# Patient Record
Sex: MALE | Race: OTHER | URBAN - METROPOLITAN AREA
[De-identification: names, ages, dates, MRNs, and addresses within clinical notes are randomized per-mention and may not be internally consistent; named-entity substitution may affect disease eponyms.]

---

## 2018-04-05 ENCOUNTER — HOSPITAL ENCOUNTER (OUTPATIENT)
Dept: LAB | Age: 38
Discharge: HOME | End: 2018-04-05

## 2018-04-05 ENCOUNTER — OFFICE VISIT (OUTPATIENT)
Dept: FAMILY MEDICINE CLINIC | Age: 38
End: 2018-04-05

## 2018-04-05 VITALS
BODY MASS INDEX: 30.01 KG/M2 | HEART RATE: 62 BPM | DIASTOLIC BLOOD PRESSURE: 85 MMHG | WEIGHT: 191.2 LBS | TEMPERATURE: 97.9 F | SYSTOLIC BLOOD PRESSURE: 124 MMHG | HEIGHT: 67 IN

## 2018-04-05 DIAGNOSIS — Z13.9 ENCOUNTER FOR SCREENING: Primary | ICD-10-CM

## 2018-04-05 DIAGNOSIS — H10.021 OTHER MUCOPURULENT CONJUNCTIVITIS OF RIGHT EYE: ICD-10-CM

## 2018-04-05 DIAGNOSIS — N48.89 NODULE OF SHAFT OF PENIS: ICD-10-CM

## 2018-04-05 DIAGNOSIS — L73.9 FOLLICULITIS: ICD-10-CM

## 2018-04-05 LAB
BILIRUB UR QL STRIP: NEGATIVE
GLUCOSE POC: NORMAL MG/DL
GLUCOSE UR-MCNC: NEGATIVE MG/DL
KETONES P FAST UR STRIP-MCNC: NEGATIVE MG/DL
PH UR STRIP: 7 [PH] (ref 4.6–8)
PROT UR QL STRIP: NEGATIVE
SP GR UR STRIP: 1.01 (ref 1–1.03)
UA UROBILINOGEN AMB POC: NORMAL (ref 0.2–1)
URINALYSIS CLARITY POC: CLEAR
URINALYSIS COLOR POC: YELLOW
URINE BLOOD POC: NEGATIVE
URINE LEUKOCYTES POC: NEGATIVE
URINE NITRITES POC: NEGATIVE

## 2018-04-05 PROCEDURE — 86592 SYPHILIS TEST NON-TREP QUAL: CPT | Performed by: PHYSICIAN ASSISTANT

## 2018-04-05 PROCEDURE — 87491 CHLMYD TRACH DNA AMP PROBE: CPT | Performed by: PHYSICIAN ASSISTANT

## 2018-04-05 PROCEDURE — 87389 HIV-1 AG W/HIV-1&-2 AB AG IA: CPT | Performed by: PHYSICIAN ASSISTANT

## 2018-04-05 RX ORDER — CEPHALEXIN 500 MG/1
500 CAPSULE ORAL 3 TIMES DAILY
Qty: 30 CAP | Refills: 0 | Status: SHIPPED | OUTPATIENT
Start: 2018-04-05

## 2018-04-05 RX ORDER — ERYTHROMYCIN 5 MG/G
OINTMENT OPHTHALMIC
Qty: 1 G | Refills: 0 | Status: SHIPPED | OUTPATIENT
Start: 2018-04-05

## 2018-04-05 NOTE — PROGRESS NOTES
Coordination of Care  1. Have you been to the ER, urgent care clinic since your last visit? Hospitalized since your last visit? No    2. Have you seen or consulted any other health care providers outside of the [facility] since your last visit? Include any pap smears or colon screening. No    Does the patient need refills?  NO    Learning Assessment Complete? yes  Results for orders placed or performed in visit on 04/05/18   AMB POC GLUCOSE BLOOD, BY GLUCOSE MONITORING DEVICE   Result Value Ref Range    Glucose POC 99 nf mg/dL   AMB POC URINALYSIS DIP STICK MANUAL W/O MICRO   Result Value Ref Range    Color (UA POC) Yellow     Clarity (UA POC) Clear     Glucose (UA POC) Negative Negative    Bilirubin (UA POC) Negative Negative    Ketones (UA POC) Negative Negative    Specific gravity (UA POC) 1.015 1.001 - 1.035    Blood (UA POC) Negative Negative    pH (UA POC) 7.0 4.6 - 8.0    Protein (UA POC) Negative Negative    Urobilinogen (UA POC) normal 0.2 - 1    Nitrites (UA POC) Negative Negative    Leukocyte esterase (UA POC) Negative Negative

## 2018-04-05 NOTE — PROGRESS NOTES
Assessment/Plan:    Diagnoses and all orders for this visit:    1. Encounter for screening  -     AMB POC GLUCOSE BLOOD, BY GLUCOSE MONITORING DEVICE  -     AMB POC URINALYSIS DIP STICK MANUAL W/O MICRO    2. Folliculitis  -     cephALEXin (KEFLEX) 500 mg capsule; Take 1 Cap by mouth three (3) times daily. - this is within his pubic hair region, it is a rather sizable pustule today with surrounding erythema and tenderness      3. Nodule of shaft of penis  -     RPR; Future  -     HIV 1/2 AG/AB, 4TH GENERATION,W RFLX CONFIRM; Future  -     CHLAMYDIA/GC PCR; Future  Pt states this is new, it has an unusual appearance and I want to re-examine it after completion of his medication, it does not appear to be inflammatory but he states that it started at the same time as the more typical appearing lesion in his pubic hair region (mons pubis)    4. Other mucopurulent conjunctivitis of right eye  -     erythromycin (ILOTYCIN) ophthalmic ointment; Sig; use 0.5 cm ribbon in OD qid for 5 days    I called the pharmacy to ensure that both of his medications are on their $4 list and they both are - I left him a message with this information    Follow-up Disposition:  Return in about 3 weeks (around 4/26/2018). JESUS Blount expressed understanding of this plan. An AVS was printed and given to the patient.      ----------------------------------------------------------------------    Chief Complaint   Patient presents with    Urinary Burning     pt c/o pain and burning during urination and intercourse, also itchy bumps on penis.  Other     pt c/o frequent urination at night, blurry vision, feels tired. History of Present Illness:  Pt here for lesions on his penis. He tells me that he DOES not have dysuria but that he has spots that are concerning to him on his penis that are new.  He states that his wife, with whom he has been monogamous for 3 years, has recently been treated for a yeast infection and this is when he noticed his lesions. He states that his wife does not have any lesions or discharge now    He also has a new problem with redness and discharge from his right eye only that has been present for more then one week. This eye is now sensitive to light   He does not have any joint pain/knee pain, etc.         No past medical history on file. Current Outpatient Prescriptions   Medication Sig Dispense Refill    cephALEXin (KEFLEX) 500 mg capsule Take 1 Cap by mouth three (3) times daily. 30 Cap 0    erythromycin (ILOTYCIN) ophthalmic ointment Sig; use 0.5 cm ribbon in OD qid for 5 days 1 g 0    loratadine (CLARITIN) 10 mg tablet Take 1 Tab by mouth daily. 30 Tab 6    triamcinolone acetonide (KENALOG) 0.1 % topical cream Apply  to affected area two (2) times a day. use thin layer 80 g 0       No Known Allergies    Social History   Substance Use Topics    Smoking status: Former Smoker     Types: Cigarettes     Quit date: 4/5/2016    Smokeless tobacco: Former User     Quit date: 3/30/2013      Comment: 1 a day    Alcohol use Yes      Comment: occ       No family history on file. Physical Exam:     Visit Vitals    /85 (BP 1 Location: Right arm)    Pulse 62    Temp 97.9 °F (36.6 °C) (Oral)    Ht 5' 6.61\" (1.692 m)    Wt 191 lb 3.2 oz (86.7 kg)    BMI 30.29 kg/m2       A&Ox3  WDWN NAD  Respirations normal and non labored  Right eye: red conjunctiva with swelling of the eyelids, no acute discharge at this time  Left eye is clear  : uncircumcised penis. On shaft of foreskin there is a 3-4 mm nodular mass with central indentation.  Within his pubic hair mons pubis region he has a large red tender pustular lesion with surrounding induration  Results for orders placed or performed in visit on 04/05/18   AMB POC URINALYSIS DIP STICK MANUAL W/O MICRO     Status: None   Result Value Ref Range Status    Color (UA POC) Yellow  Final    Clarity (UA POC) Clear  Final Glucose (UA POC) Negative Negative Final    Bilirubin (UA POC) Negative Negative Final    Ketones (UA POC) Negative Negative Final    Specific gravity (UA POC) 1.015 1.001 - 1.035 Final    Blood (UA POC) Negative Negative Final    pH (UA POC) 7.0 4.6 - 8.0 Final    Protein (UA POC) Negative Negative Final    Urobilinogen (UA POC) normal 0.2 - 1 Final    Nitrites (UA POC) Negative Negative Final    Leukocyte esterase (UA POC) Negative Negative Final

## 2018-04-05 NOTE — MR AVS SNAPSHOT
[address] [place] [address] [address] [facility] 57 
[phone] Patient: Saravanan Ibanez 
MRN: [id number] :1980 Visit Information Elizabeth Simms y Nano Personal Médico Departamento Teléfono del Dep. Número de visita 2018 10:00 AM Cortez Hugo Leticialeumel Gonsalo, REJI Jackson [phone] Follow-up Instructions Return in about 3 weeks (around 2018). Upcoming Health Maintenance Date Due Pneumococcal 19-64 Medium Risk (1 of 1 - PPSV23) 10/25/1999 DTaP/Tdap/Td series (1 - Tdap) 10/25/2001 Influenza Age 5 to Adult 2017 Alergias  Review Complete El: 2018 Por: Gabrielle Osorio A partir del:  2018 No Known Allergies Vacunas actuales Revisadas el:  2016 Tre Gannon Influenza Vaccine (Quad) PF 2016 No revisadas esta visita You Were Diagnosed With   
  
 Xavier Funes Encounter for screening    -  Primary ICD-10-CM: Z13.9 ICD-9-CM: V82.9 Folliculitis     ESO-25-RU: L73.9 ICD-9-CM: 704.8 Nodule of shaft of penis     ICD-10-CM: N48.89 ICD-9-CM: 607.89 Other mucopurulent conjunctivitis of right eye     ICD-10-CM: H10.021 ICD-9-CM: 372.03 Partes vitales PS Pulso Temperatura [place] ( percentil de crecimiento) Peso (percentil de crecimiento) BMI (IMC)  
 124/85 (BP 1 Location: Right arm) 62 97.9 °F (36.6 °C) (Oral) 5' 6.61\" (1.692 m) 191 lb 3.2 oz (86.7 kg) 30.29 kg/m2 Estatus de tabaquísmo Former Smoker Historial de signos vitales BMI and BSA Data Body Mass Index Body Surface Area  
 30.29 kg/m 2 2.02 m 2 [facility] Name Phone [address], [address] [place], [place] [phone] Bello lista de medicamentos actualizada Idania Ramires actualizada 18 12:28 PM.  Paco Cloud use bello lista de medicamentos más reciente. cephALEXin 500 mg capsule También conocido ranjan:  Candy Gabriel Take 1 Cap by mouth three (3) times daily. erythromycin ophthalmic ointment También conocido ranjan:  ILOTYCIN Sig; use 0.5 cm ribbon in OD qid for 5 days  
  
 loratadine 10 mg tablet También conocido ranjan:  Vivian Nett Take 1 Tab by mouth daily. triamcinolone acetonide 0.1 % topical cream  
También conocido ranjan:  KENALOG Apply  to affected area two (2) times a day. use thin layer Recetas Enviado a la Tyrell Refills  
 cephALEXin (KEFLEX) 500 mg capsule 0 Sig: Take 1 Cap by mouth three (3) times daily. Class: Normal  
 Pharmacy: [address] [place] [address]  Ph #: [phone] Route: Oral  
 erythromycin (ILOTYCIN) ophthalmic ointment 0 Sig: Sig; use 0.5 cm ribbon in OD qid for 5 days Class: Normal  
 Pharmacy: [address] [place] [address]  Ph #: [phone] Hicimos lo siguiente AMB POC GLUCOSE BLOOD, BY GLUCOSE MONITORING DEVICE [00157 CPT(R)] AMB POC URINALYSIS DIP STICK MANUAL W/O MICRO [78551 CPT(R)] Instrucciones de seguimiento Return in about 3 weeks (around 4/26/2018). Instrucciones para el Paciente Conjuntivitis: Instrucciones de cuidado - [ Pinkeye: Care Instructions ] Instrucciones de cuidado La conjuntivitis es el enrojecimiento y la hinchazón de la superficie del liv y de la conjuntiva (el recubrimiento del párpado y de la parte mickie del liv). La conjuntivitis suele ser causada por mandeep infección bacteriana o viral. El aire seco, las [place], [place] humo y las sustancias químicas son otras causas comunes. La conjuntivitis suele sanar por sí jorge al cabo de 7 a 10 días. Los antibióticos solo ayudan si la conjuntivitis está causada por bacterias. La conjuntivitis causada por mandeep infección se propaga fácilmente.  Si mandeep alergia o sustancia química es la causa de la conjuntivitis, esta no desaparecerá a menos que usted evite lo que la esté causando. La atención de seguimiento es mandeep parte clave de lozano tratamiento y seguridad. Asegúrese de hacer y acudir a todas las citas, y llame a lozano médico si está teniendo problemas. También es mandeep buena idea saber los resultados de los exámenes y mantener mandeep lista de los medicamentos que gonzález. Cómo puede cuidarse en el hogar? · Lávese las huong con frecuencia. Siempre láveselas antes y después de tratarse la conjuntivitis o de tocarse los ojos o la nigel. · Utilice un algodón húmedo o un paño limpio y húmedo para retirar las costras. Limpie desde la esquina interior del liv hacia afuera. Use mandeep parte limpia del paño para cada pasada. · Colóquese paños húmedos, fríos o tibios, sobre el liv unas cuantas veces al día si el liv le duele. · No use lentes de contacto ni maquillaje para los ojos hasta que la conjuntivitis haya desaparecido. Deseche todo el maquillaje para ojos que usaba cuando comenzó la conjuntivitis. Limpie fartun lentes de contacto y lozano estuche. Si [place] lentes de contacto desechables, use un par nuevo cuando el liv haya sanado y sea seguro volver a usar lentes de contacto. · Si el médico le recetó mandeep pomada o gotas antibióticas para los ojos, úselas según las indicaciones. Use el medicamento todo el tiempo indicado, aunque el liv comience a verse mejor en poco tiempo. Mantenga limpia la punta del frasco y no permita que la misma toque la olayinka del liv. · Para ponerse gotas para los ojos o pomada: 
¨ Incline la [place] atrás y lleve el párpado inferior hacia abajo con un dedo. ¨ Deje caer unas gotas o un chorrito del medicamento dentro del párpado inferior. ¨ Cierre el liv por entre 30 y 61 segundos para permitir que las gotas o la pomada se esparzan. ¨ No permita que la punta del gotero o del tubo de pomada toque las pestañas ni ninguna otra superficie. · No comparta toallas de baño, almohadas ni toallitas para la nigel mientras tenga conjuntivitis. Cuándo debe pedir ayuda? Llame a lozano médico ahora mismo o busque atención médica inmediata si: 
? · Tiene dolor en el liv, no solo irritación en la superficie. ? · Tiene algún cambio en la vista o pérdida de la visión. ? · Tiene mayor secreción del liv. ? · El liv no ha comenzado a mejorar o empeora dentro de las 50 horas después de empezar a usar antibióticos. ? · La conjuntivitis dura más de 7 días. ?Preste especial atención a los cambios en lozano alycia y asegúrese de comunicarse con lozano médico si tiene algún problema. Dónde puede encontrar más información en inglés? Radha Cortez a [email or web address] Sree Her [id number] en la búsqueda para aprender más acerca de \"Conjuntivitis: Instrucciones de cuidado - [ Pinkeye: Care Instructions ]. \" 
Revisado: 20 marzo, 2017 Versión del contenido: 11.4 © 7075-9406 Healthwise, Incorporated. Las instrucciones de cuidado fueron adaptadas bajo licencia por [employer] (which disclaims liability or warranty for this information). Si usted tiene [place] afección médica o sobre estas instrucciones, siempre pregunte a lozano profesional de alycia. Healthwise, Incorporated niega toda garantía o responsabilidad por lozano uso de esta información. Foliculitis: Instrucciones de cuidado - [ Folliculitis: Care Instructions ] Instrucciones de cuidado La foliculitis es Jose-Belle sacos (folículos) de la piel de donde crece el pelo. Puede aparecer en cualquier parte del cuerpo, maria ines es más común en el cuero cabelludo, la nigel, las axilas y la margie. Las bacterias, ranjan las que hay en mandeep bañera de hidromasaje, pueden causar foliculitis. La foliculitis comienza ranjan mandeep olayinka enrojecida y sensible cerca de un pelo. La piel puede picar o arder y [place].  A veces, la foliculitis puede conducir a infecciones cutáneas (de la piel) más graves. Por lo general, lozano médico puede tratar la foliculitis leve con mandeep crema o mandeep pomada antibióticas. Si tiene foliculitis en el cuero cabelludo, puede usar un champú que elimina las bacterias. Los antibióticos que se dutch en pastillas pueden tratar infecciones más profundas en la piel. Para los casos rebeldes de foliculitis, el tratamiento con láser podría ser mandeep opción. El tratamiento con láser utiliza claire intensos de clemencia para destruir el folículo del pelo. Pinky el pelo no volverá a crecer en la olayinka tratada. La atención de seguimiento es mandeep parte clave de lozano tratamiento y seguridad. Asegúrese de hacer y acudir a todas las citas, y llame a lozano médico si está teniendo problemas. También es mandeep buena idea saber los resultados de los exámenes y mantener mandeep lista de los medicamentos que gonzález. Cómo puede cuidarse en el hogar? · [employer] medicamentos exactamente ranjan le fueron recetados. Si lozano médico le recetó antibióticos, tómelos según las indicaciones. No deje de tomarlos por el hecho de sentirse mejor. Debe moy todos los antibióticos hasta terminarlos. · Utilice jabón que elimine las bacterias para lavarse la olayinka infectada. Si tiene el cuero cabelludo o la smith infectados, utilice champú con selenio o propilenglicol. Phylliss Lovell. No frote gordo mucho tiempo ni con demasiada fuerza. · Mezcle 1 y 1/3 tazas de agua tibia y 1 cucharada de vinagre. Humedezca un paño en la mezcla y colóquelo sobre la piel infectada hasta que se enfríe (por lo general de 5 a 10 minutos). Podría hacer esto entre 3 y 6 veces al día. · No comparta lozano afeitadora, toallas ni paños para lavarse. [place] puede hacer propagar la foliculitis. · Use mandeep hoja de afeitar nueva cada vez que se afeite para evitar volver a infectar lozano piel. · Si tiende a tener foliculitis, evite las bañeras de hidromasaje. Pueden tener bacterias que causan foliculitis. Cuándo debe pedir ayuda? Llame a lozano médico ahora mismo o busque atención médica inmediata si: 
? · Tiene síntomas de infección, tales ranjan: ¨ Aumento del dolor, la hinchazón, la temperatura o el enrojecimiento. ¨ Vetas rojizas que salen de la olayinka. ¨ Pus que sale de la olayinka. Kacie Rojas. ?Preste especial atención a los cambios en lozano alycia y asegúrese de comunicarse con lozano médico si: 
? · No mejora ranjan se esperaba. Dónde puede encontrar más información en inglés? Vic Car a [email or web address] Guanaco Nielsen [id number] en la búsqueda para aprender más acerca de \"Foliculitis: Instrucciones de cuidado - [ Folliculitis: Care Instructions ]. \" 
Revisado: 13 octubre, 2016 Versión del contenido: 11.4 © 4687-1309 Healthwise, Incorporated. Las instrucciones de cuidado fueron adaptadas bajo licencia por [employer] (which disclaims liability or warranty for this information). Si usted tiene [place] afección médica o sobre estas instrucciones, siempre pregunte a lozano profesional de alycia. Healthwise, Incorporated niega toda garantía o responsabilidad por lozano uso de esta información. Introducing [facility]! Cal Finney introduce portal paciente MyChart . Ahora se puede acceder a partes de lozano expediente médico, enviar por correo electrónico la oficina de lozano médico y solicitar renovaciones de medicamentos en línea. En lozano navegador de Internet , [place] a [email or web address] Meir enrico en el usuario por Reynold Cotton? Chidi Cordone enrico aquí en la sesión [place]. Verá la página de registro [place]. Ingrese lozano código de [employer] austin y ranjan aparece a continuación. Usted no tendrá que [employer] código después de aamir completado el proceso de registro . Si ted no se inscribe antes de la fecha de caducidad , debe solicitar un nuevo código. · MyChart Código de acceso : [id number] Expires: 7/4/2018 12:28 PM 
 
 Ingresa los últimos cuatro dígitos de lozano Número de Seguro Social ( xxxx ) y fecha de nacimiento ( dd / mm / aaaa ) ranjan se indica y meir clic en Enviar. Usted será llevado a la siguiente página de registro . Crear un ID MyChart . Esta será lozano ID de inicio de sesión de MyChart y no puede ser [place] , por lo que pensar en mandeep que es [place] y fácil de recordar . Crear mandeep contraseña MyChart . Usted puede cambiar lozano contraseña en cualquier momento . Ingrese lozano Password Reset de preguntas y [place] . [place] se puede utilizar en un momento posterior si usted olvida lozano contraseña. Introduzca lozano dirección de correo electrónico . [place] recibirá mandeep notificación por correo electrónico cuando la nueva información está disponible en MyChart . Dayanara Knock clic en Registrarse. Odette Roman [employer] yolanda y descargar porciones de lozano expediente médico. 
Meir clic en el enlace de descarga del menú Resumen para descargar mandeep copia portátil de lozano información médica . Si tiene [employer] , por favor visite la sección de preguntas frecuentes del sitio web MyChart . Recuerde, MyChart NO es que se utilizará para las necesidades urgentes. Para emergencias médicas , llame al 911 . Ahora disponible en lozano iPhone y Android ! Por favor proporcione mago resumen de la documentación de cuidado a lozano próximo proveedor. If you have any questions after today's visit, please call [phone].

## 2018-04-05 NOTE — PROGRESS NOTES
AVS printed and reviewed. Keflex is $4 at [facility] and Eye ointment  w/ Good Rx coupon is $55.   Eye ointment should be $4 at [facility]. Pt called by provider. Message left re cost of eye ointment. Pt called back and $4 eye ointment discussed. F/u appt scheduled.

## 2018-04-05 NOTE — PATIENT INSTRUCTIONS
Conjuntivitis: Instrucciones de cuidado - [ Pinkeye: Care Instructions ]  Instrucciones de cuidado    La conjuntivitis es el enrojecimiento y la hinchazón de la superficie del liv y de la conjuntiva (el recubrimiento del párpado y de la parte mickie del liv). La conjuntivitis suele ser causada por mandeep infección bacteriana o viral. El aire seco, las [place] humo y las sustancias químicas son otras causas comunes. La conjuntivitis suele sanar por sí jorge al cabo de 7 a 10 días. Los antibióticos solo ayudan si la conjuntivitis está causada por bacterias. La conjuntivitis causada por mandeep infección se propaga fácilmente. Si mandeep alergia o sustancia química es la causa de la conjuntivitis, esta no desaparecerá a menos que usted evite lo que la esté causando. La atención de seguimiento es mandeep parte clave de lozano tratamiento y seguridad. Asegúrese de hacer y acudir a todas las citas, y llame a lozano médico si está teniendo problemas. También es mandeep buena idea saber los resultados de los exámenes y mantener mandeep lista de los medicamentos que gonzález. ¿Cómo puede cuidarse en el hogar? · Lávese las huong con frecuencia. Siempre láveselas antes y después de tratarse la conjuntivitis o de tocarse los ojos o la nigel. · Utilice un algodón húmedo o un paño limpio y húmedo para retirar las costras. Limpie desde la esquina interior del liv hacia afuera. Use mandeep parte limpia del paño para cada pasada. · Colóquese paños húmedos, fríos o tibios, sobre el liv unas cuantas veces al día si el liv le duele. · No use lentes de contacto ni maquillaje para los ojos hasta que la conjuntivitis haya desaparecido. Deseche todo el maquillaje para ojos que usaba cuando comenzó la conjuntivitis. Limpie fartun lentes de contacto y lozano estuche. Si [place] lentes de contacto desechables, use un par nuevo cuando el liv haya sanado y sea seguro volver a usar lentes de contacto.   · Si el médico le recetó [employer] o gotas antibióticas para los ojos, [facility] según las indicaciones. Use el medicamento todo el tiempo indicado, aunque el liv comience a verse mejor en poco tiempo. Mantenga limpia la punta del frasco y no permita que la misma toque la olayinka del lvi. · Para ponerse gotas para los ojos o pomada:  ¨ Incline la [place] atrás y lleve el párpado inferior hacia abajo con un dedo. ¨ Deje caer unas gotas o un chorrito del medicamento dentro del párpado inferior. ¨ Cierre el liv por entre 30 y 61 segundos para permitir que las gotas o la pomada se esparzan. ¨ No permita que la punta del gotero o del tubo de pomada toque las pestañas ni ninguna otra superficie. · No comparta toallas de baño, almohadas ni toallitas para la nigel mientras tenga conjuntivitis. ¿Cuándo debe pedir ayuda? Llame a lozano médico ahora mismo o busque atención médica inmediata si:  ? · Tiene dolor en el liv, no solo irritación en la superficie. ? · Tiene algún cambio en la vista o pérdida de la visión. ? · Tiene mayor secreción del liv. ? · El liv no ha comenzado a mejorar o empeora dentro de las 50 horas después de empezar a usar antibióticos. ? · La conjuntivitis dura más de 7 días. ?Preste especial atención a los cambios en lozano alycia y asegúrese de comunicarse con lozano médico si tiene algún problema. ¿Dónde puede encontrar más información en inglés? Jacob Brody a http://edd-renzo.info/. Jonny Espianl [id number] en la búsqueda para aprender más acerca de \"Conjuntivitis: Instrucciones de cuidado - [ Luis Miguel: Care Instructions ]. \"  Revisado: 20 Minoo Poisson 2017  Versión del contenido: 11.4  © 7934-2730 Healthwise, Incorporated. Las instrucciones de cuidado fueron adaptadas bajo licencia por [employer] (which disclaims liability or warranty for this information). Si usted tiene [place] afección médica o sobre estas instrucciones, siempre pregunte a lozano profesional de alycia.  [employer] niega toda garantía o responsabilidad por lozano uso de esta información. Foliculitis: Instrucciones de cuidado - [ Folliculitis: Care Instructions ]  Instrucciones de cuidado    La foliculitis es mandeep infección en los sacos (folículos) de la piel de donde crece el pelo. Puede aparecer en cualquier parte del cuerpo, pinky es más común en el cuero cabelludo, la nigel, las axilas y la margie. Las bacterias, ranjan las que hay en mandeep bañera de hidromasaje, pueden causar foliculitis. La foliculitis comienza ranjan mandeep olayinka enrojecida y sensible cerca de un pelo. La piel puede picar o arder y [place]. A veces, la foliculitis puede conducir a infecciones cutáneas (de la piel) más graves. Por lo general, lozano médico puede tratar la foliculitis leve con mandeep crema o mandeep pomada antibióticas. Si tiene foliculitis en el cuero cabelludo, puede usar un champú que elimina las bacterias. Los antibióticos que se dutch en pastillas pueden tratar infecciones más profundas en la piel. Para los casos rebeldes de foliculitis, el tratamiento con láser podría ser mandeep opción. El tratamiento con láser utiliza claire intensos de clemencia para destruir el folículo del pelo. Pinky el pelo no volverá a crecer en la olayinka tratada. La atención de seguimiento es mandeep parte clave de lozano tratamiento y seguridad. Asegúrese de hacer y acudir a todas las citas, y llame a lozano médico si está teniendo problemas. También es mandeep buena idea saber los resultados de los exámenes y mantener mandeep lista de los medicamentos que gonzález. ¿Cómo puede cuidarse en el hogar? · [employer] medicamentos exactamente ranjan le fueron recetados. Si lozano médico le recetó antibióticos, tómelos según las indicaciones. No deje de tomarlos por el hecho de sentirse mejor. Debe moy todos los antibióticos hasta terminarlos. · Utilice jabón que elimine las bacterias para lavarse la olayinka infectada. Si tiene el cuero cabelludo o la smith infectados, utilice champú con selenio o propilenglicol. Josh Whitehead.  No frote gordo mucho tiempo ni con demasiada fuerza. · Mezcle 1 y 1/3 tazas de agua tibia y 1 cucharada de vinagre. Humedezca un paño en la mezcla y colóquelo sobre la piel infectada hasta que se enfríe (por lo general de 5 a 10 minutos). Podría hacer esto entre 3 y 6 veces al día. · No comparta lozano afeitadora, toallas ni paños para lavarse. [place] puede hacer propagar la foliculitis. · Use mandeep hoja de afeitar nueva cada vez que se afeite para evitar volver a infectar lozano piel. · Si tiende a tener foliculitis, evite las bañeras de hidromasaje. Pueden tener bacterias que causan foliculitis. ¿Cuándo debe pedir ayuda? Llame a lozano médico ahora mismo o busque atención médica inmediata si:  ? · Tiene síntomas de infección, tales ranjan:  ¨ Aumento del dolor, la hinchazón, la temperatura o el enrojecimiento. ¨ Vetas rojizas que salen de la olayinka. ¨ Pus que sale de la olayinka. [place]. ?Preste especial atención a los cambios en lozano alycia y asegúrese de comunicarse con lozano médico si:  ? · No mejora ranjan se esperaba. ¿Dónde puede encontrar más información en inglés? Razia Michele a http://edd-renzo.info/. Adilia Frank [id number] en la búsqueda para aprender más acerca de \"Foliculitis: Instrucciones de cuidado - [ Folliculitis: Care Instructions ]. \"  Revisado: 13 octubre, 2016  Versión del contenido: 11.4  © 3482-1432 Healthwise, Incorporated. Las instrucciones de cuidado fueron adaptadas bajo licencia por [employer] (which disclaims liability or warranty for this information). Si usted tiene [place] afección médica o sobre estas instrucciones, siempre pregunte a lozano profesional de alycia. [facility] niega toda garantía o responsabilidad por lozano uso de esta información.

## 2018-04-06 LAB
C TRACH DNA SPEC QL NAA+PROBE: NEGATIVE
HIV 1+2 AB+HIV1 P24 AG SERPL QL IA: NONREACTIVE
HIV12 RESULT COMMENT, HHIVC: NORMAL
N GONORRHOEA DNA SPEC QL NAA+PROBE: NEGATIVE
RPR SER QL: NONREACTIVE
SAMPLE TYPE: NORMAL
SERVICE CMNT-IMP: NORMAL
SPECIMEN SOURCE: NORMAL

## 2018-04-27 ENCOUNTER — OFFICE VISIT (OUTPATIENT)
Dept: FAMILY MEDICINE CLINIC | Age: 38
End: 2018-04-27

## 2018-04-27 VITALS
SYSTOLIC BLOOD PRESSURE: 132 MMHG | DIASTOLIC BLOOD PRESSURE: 73 MMHG | HEART RATE: 87 BPM | BODY MASS INDEX: 30.74 KG/M2 | WEIGHT: 194 LBS | TEMPERATURE: 98.1 F

## 2018-04-27 DIAGNOSIS — Z11.4 ENCOUNTER FOR SCREENING FOR HIV: Primary | ICD-10-CM

## 2018-04-27 NOTE — PROGRESS NOTES
Coordination of Care  1. Have you been to the ER, urgent care clinic since your last visit? Hospitalized since your last visit? No    2. Have you seen or consulted any other health care providers outside of the [address] [place] [address] since your last visit? Include any pap smears or colon screening. No    Does the patient need refills? N/A    Learning Assessment Complete?  yes

## 2019-10-31 ENCOUNTER — OFFICE VISIT (OUTPATIENT)
Dept: FAMILY MEDICINE CLINIC | Age: 39
End: 2019-10-31

## 2019-10-31 VITALS
BODY MASS INDEX: 30.13 KG/M2 | HEIGHT: 67 IN | TEMPERATURE: 97.7 F | SYSTOLIC BLOOD PRESSURE: 135 MMHG | HEART RATE: 69 BPM | WEIGHT: 192 LBS | DIASTOLIC BLOOD PRESSURE: 88 MMHG | OXYGEN SATURATION: 98 %

## 2019-10-31 DIAGNOSIS — Z23 ENCOUNTER FOR IMMUNIZATION: ICD-10-CM

## 2019-10-31 DIAGNOSIS — R10.31 GROIN PAIN, RIGHT: Primary | ICD-10-CM

## 2019-10-31 RX ORDER — NAPROXEN 500 MG/1
500 TABLET ORAL 2 TIMES DAILY WITH MEALS
Qty: 60 TAB | Refills: 0 | Status: SHIPPED | OUTPATIENT
Start: 2019-10-31

## 2019-10-31 NOTE — PROGRESS NOTES
Assessment/Plan:    Diagnoses and all orders for this visit:    1. Groin pain, right  -     REFERRAL TO GENERAL SURGERY  -     naproxen (NAPROSYN) 500 mg tablet; Take 1 Tab by mouth two (2) times daily (with meals). Diff dx includes both inguinal hernia and groin strain. He was instructed to limit lifting, to watch for warning signs for worsening of the sxs, when to return for care   Would appreciate surgical eval and recommendation    Follow-up and Dispositions    · Return if symptoms worsen or fail to improve. JESUS Manjarrez expressed understanding of this plan. An AVS was printed and given to the patient.      ----------------------------------------------------------------------    Chief Complaint   Patient presents with    Groin Pain     right side pain x 1 weeks  worse with walking       History of Present Illness:  Pt presents with one week or right groin pain that started w/out any clear injury or another precipitating event. The pain is not radiating. He has no urinary sxs. He has reproduced pain at work with lifting now that the pain has started. He has not noticed a bulge or mass at the area of pain and it is not red  He has not tried any specific remedies for the problem        No past medical history on file. Current Outpatient Medications   Medication Sig Dispense Refill    naproxen (NAPROSYN) 500 mg tablet Take 1 Tab by mouth two (2) times daily (with meals). 60 Tab 0    cephALEXin (KEFLEX) 500 mg capsule Take 1 Cap by mouth three (3) times daily. 30 Cap 0    erythromycin (ILOTYCIN) ophthalmic ointment Sig; use 0.5 cm ribbon in OD qid for 5 days 1 g 0    loratadine (CLARITIN) 10 mg tablet Take 1 Tab by mouth daily. 30 Tab 6    triamcinolone acetonide (KENALOG) 0.1 % topical cream Apply  to affected area two (2) times a day.  use thin layer 80 g 0       No Known Allergies    Social History     Tobacco Use    Smoking status: Former Smoker     Types: Cigarettes     Last attempt to quit: 4/5/2016     Years since quitting: 3.5    Smokeless tobacco: Former User     Quit date: 3/30/2013    Tobacco comment: 1 a day   Substance Use Topics    Alcohol use: Yes     Comment: occ    Drug use: No       No family history on file. Physical Exam:     Visit Vitals  /88 (BP 1 Location: Right arm, BP Patient Position: Sitting)   Pulse 69   Temp 97.7 °F (36.5 °C) (Oral)   Ht 5' 6.54\" (1.69 m)   Wt 192 lb (87.1 kg)   SpO2 98%   BMI 30.49 kg/m²     Looks well, gait is normal  A&Ox3  WDWN NAD  Respirations normal and non labored  - no obvious swelling or bulge.  On right inguinal canal exam, he is tender with the exam and on left he is not but I do not feel any appreciable mass  When supine, I repeat the exam and ask him to sit up slowly and then there is a small bulge that appears that is tender to palpation

## 2019-10-31 NOTE — PROGRESS NOTES
Coordination of Care  1. Have you been to the ER, urgent care clinic since your last visit? Hospitalized since your last visit? No    2. Have you seen or consulted any other health care providers outside of the [address] [place] [address] since your last visit? Include any pap smears or colon screening. No    Does the patient need refills? NO    Learning Assessment Complete?  yes

## 2019-10-31 NOTE — PROGRESS NOTES
AVS printed and reviewed. E script sent today discussed. Good RX coupon printed. Access Now for Specialists discussed and encouraged to schedule outreach appt to start process to qualify for Access Now program to see a General Surgeon. Reasons to go to an ED discussed. Discussion assisted by HERRERA , Jermain Foote.

## 2019-10-31 NOTE — PATIENT INSTRUCTIONS
Hernia: Instrucciones de cuidado - [ Hernia: Care Instructions ]  Instrucciones de cuidado    Mandeep hernia aparece cuando el tejido sobresale a través de mandeep olayinka débil en la pared de lozano vientre. La olayinka de la margie y del ombligo son zonas comunes para mandeep hernia. Mandeep hernia también puede aparecer cerca de la olayinka de mandeep cirugía que tuvo anteriormente. La presión de levantar objetos, estirarse demasiado o toser puede desgarrar la olayinka débil, lo que hace que la hernia sobresalga y cause dolor. Si no puede poner la hernia en lozano sitio, el tejido podría quedar atrapado fuera de la pared del vientre. Si la hernia se tuerce y pierde lozano aporte de cassia, se hinchará y morirá. A esto se le llama hernia estrangulada. Suele causar [employer]. Necesita tratamiento de inmediato. Algunas hernias necesitan repararse para prevenir que se estrangulen. Si lozano hernia le causa síntomas o es [address] [facility], es posible que necesite [facility]. La atención de seguimiento es mandeep parte clave de lozano tratamiento y seguridad. Asegúrese de hacer y acudir a todas las citas, y llame a lozano médico si está teniendo problemas. También es mandeep buena idea saber los resultados de fartun exámenes y mantener mandeep lista de los medicamentos que gonzález. ¿Cómo puede cuidarse en el hogar? · Tenga cuidado al levantar objetos pesados. · Mantenga un peso saludable. · No fume. Fumar puede provocar tos, lo cual puede hacer que sobresalga la hernia. Si necesita ayuda para dejar de fumar, hable con lozano médico [employer] y medicamentos para dejar de fumar. Éstos pueden aumentar fartun probabilidades de dejar el hábito para siempre. · Hable con lozano médico antes de usar un corsé o un braguero para mandeep hernia. No se recomiendan estos aparatos para tratar las hernias y a veces pueden hacer más daño que beneficio. Podrían aamir debbieertos Nadia Lynn que lozano médico piense que un braguero podría funcionar, maria ines estos casos son poco comunes.   ¿Cuándo debe pedir ayuda? Llame a lozano médico ahora mismo o busque atención médica inmediata si:    · Tiene dolor repentino e intenso en la olayinka de la hernia.     · Vomita o tiene náuseas.     · Tiene dolor abdominal y no está pasando gases ni heces.     · No puede empujar lozano hernia hacia lozano lugar con presión suave cuando se recuesta.     · La piel que cubre la hernia se enrojece o se pone sensible.    Preste especial atención a los cambios en lozano alycia y asegúrese de comunicarse con lozano médico si:    · Tiene dolor nuevo o más intenso.     · No mejora ranjan se esperaba. ¿Dónde puede encontrar más información en inglés? Mariajose Burden a http://edd-renzo.info/. Escriba [id number] en la búsqueda para aprender más acerca de \"Hernia: Instrucciones de cuidado - [ Hernia: Care Instructions ]. \"  Revisado: 7 noviembre, 2018  Versión del contenido: 12.2  © 4299-0843 Healthwise, Incorporated. Las instrucciones de cuidado fueron adaptadas bajo licencia por [employer] (which disclaims liability or warranty for this information). Si usted tiene [place] afección médica o sobre estas instrucciones, siempre pregunte a lozano profesional de alycia. Healthwise, Incorporated niega toda garantía o responsabilidad por lozano uso de esta información.

## 2019-10-31 NOTE — PROGRESS NOTES
Sree Gleason  Requests flu vaccine. Denies fever and egg allergy. VIS information sheet given to patient. Explained possible s/e. Reviewed s/sx indicating need to be seen in ER. Patient had no adverse reaction at time of discharge. Entered into VIIS. GIVEN BY GREGORY ANTHONY RN.  Mushtaq Enriquez RN

## 2019-11-15 ENCOUNTER — TELEPHONE (OUTPATIENT)
Dept: FAMILY MEDICINE CLINIC | Age: 39
End: 2019-11-15

## 2019-11-15 NOTE — TELEPHONE ENCOUNTER
Jacoby Santana  Call main office 11/15/19 @ 10:35am wanted to confirmed his apt with SW . Patient is aware of the apt address and time .     Thank you

## 2019-11-16 ENCOUNTER — OFFICE VISIT (OUTPATIENT)
Dept: FAMILY MEDICINE CLINIC | Age: 39
End: 2019-11-16

## 2019-11-16 DIAGNOSIS — Z71.89 COUNSELING AND COORDINATION OF CARE: Primary | ICD-10-CM

## 2023-06-23 ENCOUNTER — HOSPITAL ENCOUNTER (EMERGENCY)
Facility: HOSPITAL | Age: 43
Discharge: HOME | End: 2023-06-23
Attending: EMERGENCY MEDICINE

## 2023-06-23 ENCOUNTER — APPOINTMENT (OUTPATIENT)
Facility: HOSPITAL | Age: 43
End: 2023-06-23

## 2023-06-23 VITALS
DIASTOLIC BLOOD PRESSURE: 73 MMHG | TEMPERATURE: 98 F | RESPIRATION RATE: 18 BRPM | OXYGEN SATURATION: 95 % | SYSTOLIC BLOOD PRESSURE: 121 MMHG | HEART RATE: 103 BPM

## 2023-06-23 DIAGNOSIS — J02.0 STREPTOCOCCAL SORE THROAT: Primary | ICD-10-CM

## 2023-06-23 LAB
ALBUMIN SERPL-MCNC: 3.7 G/DL (ref 3.5–5)
ALBUMIN/GLOB SERPL: 0.8 (ref 1.1–2.2)
ALP SERPL-CCNC: 150 U/L (ref 45–117)
ALT SERPL-CCNC: 53 U/L (ref 12–78)
ANION GAP SERPL CALC-SCNC: 7 MMOL/L (ref 5–15)
AST SERPL-CCNC: 22 U/L (ref 15–37)
BILIRUB SERPL-MCNC: 0.6 MG/DL (ref 0.2–1)
BUN SERPL-MCNC: 12 MG/DL (ref 6–20)
BUN/CREAT SERPL: 12 (ref 12–20)
CALCIUM SERPL-MCNC: 9 MG/DL (ref 8.5–10.1)
CHLORIDE SERPL-SCNC: 101 MMOL/L (ref 97–108)
CO2 SERPL-SCNC: 28 MMOL/L (ref 21–32)
COMMENT:: NORMAL
CREAT SERPL-MCNC: 0.98 MG/DL (ref 0.7–1.3)
ERYTHROCYTE [DISTWIDTH] IN BLOOD BY AUTOMATED COUNT: 12.3 % (ref 11.5–14.5)
FLUAV AG NPH QL IA: NEGATIVE
FLUBV AG NOSE QL IA: NEGATIVE
GLOBULIN SER CALC-MCNC: 4.5 G/DL (ref 2–4)
GLUCOSE SERPL-MCNC: 109 MG/DL (ref 65–100)
HCT VFR BLD AUTO: 43.6 % (ref 36.6–50.3)
HGB BLD-MCNC: 14.5 G/DL (ref 12.1–17)
MCH RBC QN AUTO: 30.2 PG (ref 26–34)
MCHC RBC AUTO-ENTMCNC: 33.3 G/DL (ref 30–36.5)
MCV RBC AUTO: 90.8 FL (ref 80–99)
NRBC # BLD: 0 K/UL (ref 0–0.01)
NRBC BLD-RTO: 0 PER 100 WBC
PLATELET # BLD AUTO: 273 K/UL (ref 150–400)
PMV BLD AUTO: 10 FL (ref 8.9–12.9)
POTASSIUM SERPL-SCNC: 3.6 MMOL/L (ref 3.5–5.1)
PROT SERPL-MCNC: 8.2 G/DL (ref 6.4–8.2)
RBC # BLD AUTO: 4.8 M/UL (ref 4.1–5.7)
S PYO AG THROAT QL: POSITIVE
SARS-COV-2 RDRP RESP QL NAA+PROBE: NOT DETECTED
SODIUM SERPL-SCNC: 136 MMOL/L (ref 136–145)
SOURCE: NORMAL
SPECIMEN HOLD: NORMAL
WBC # BLD AUTO: 17.4 K/UL (ref 4.1–11.1)

## 2023-06-23 PROCEDURE — 2580000003 HC RX 258: Performed by: EMERGENCY MEDICINE

## 2023-06-23 PROCEDURE — 6370000000 HC RX 637 (ALT 250 FOR IP): Performed by: EMERGENCY MEDICINE

## 2023-06-23 PROCEDURE — 93005 ELECTROCARDIOGRAM TRACING: CPT | Performed by: EMERGENCY MEDICINE

## 2023-06-23 PROCEDURE — 71046 X-RAY EXAM CHEST 2 VIEWS: CPT

## 2023-06-23 PROCEDURE — 99285 EMERGENCY DEPT VISIT HI MDM: CPT

## 2023-06-23 PROCEDURE — 87635 SARS-COV-2 COVID-19 AMP PRB: CPT

## 2023-06-23 PROCEDURE — 85027 COMPLETE CBC AUTOMATED: CPT

## 2023-06-23 PROCEDURE — 36415 COLL VENOUS BLD VENIPUNCTURE: CPT

## 2023-06-23 PROCEDURE — 87880 STREP A ASSAY W/OPTIC: CPT

## 2023-06-23 PROCEDURE — 87804 INFLUENZA ASSAY W/OPTIC: CPT

## 2023-06-23 PROCEDURE — 80053 COMPREHEN METABOLIC PANEL: CPT

## 2023-06-23 RX ORDER — 0.9 % SODIUM CHLORIDE 0.9 %
1000 INTRAVENOUS SOLUTION INTRAVENOUS
Status: COMPLETED | OUTPATIENT
Start: 2023-06-23 | End: 2023-06-23

## 2023-06-23 RX ORDER — AMOXICILLIN 500 MG/1
1000 CAPSULE ORAL DAILY
Qty: 20 CAPSULE | Refills: 0 | Status: SHIPPED | OUTPATIENT
Start: 2023-06-24 | End: 2023-07-04

## 2023-06-23 RX ORDER — AMOXICILLIN 500 MG/1
1000 CAPSULE ORAL
Status: COMPLETED | OUTPATIENT
Start: 2023-06-23 | End: 2023-06-23

## 2023-06-23 RX ORDER — ACETAMINOPHEN 325 MG/1
650 TABLET ORAL
Status: COMPLETED | OUTPATIENT
Start: 2023-06-23 | End: 2023-06-23

## 2023-06-23 RX ADMIN — AMOXICILLIN 1000 MG: 500 CAPSULE ORAL at 11:39

## 2023-06-23 RX ADMIN — SODIUM CHLORIDE 1000 ML: 9 INJECTION, SOLUTION INTRAVENOUS at 10:48

## 2023-06-23 RX ADMIN — ACETAMINOPHEN 650 MG: 325 TABLET ORAL at 10:47

## 2023-06-23 ASSESSMENT — PAIN DESCRIPTION - ORIENTATION: ORIENTATION: MID

## 2023-06-23 ASSESSMENT — PAIN DESCRIPTION - DESCRIPTORS: DESCRIPTORS: ACHING

## 2023-06-23 ASSESSMENT — PAIN SCALES - GENERAL
PAINLEVEL_OUTOF10: 7
PAINLEVEL_OUTOF10: 2

## 2023-06-23 ASSESSMENT — PAIN DESCRIPTION - LOCATION: LOCATION: HEAD;THROAT

## 2023-06-23 NOTE — ED TRIAGE NOTES
Pt reports SOB started this morning and felt hot over the night. Pt states it feels like his mouth/throat feels dry. Pt reports feeling normal yesterday.  Denies chest pain/n/v. Pt took Advil around 3am.

## 2023-06-23 NOTE — ED PROVIDER NOTES
10:13 AM  Pt seen in triage. 2 days of fever, chills, cough, short of breath, and sore throat. No vomiting/diarrhea. Diffuse body aches and HA. No rash or skin changes. No hx of similar breathing problems. No sick contacts. Exam with some diffuse post erythema oropharynx. Lungs clear. Normal heart tones. No abd tenderness. No neck pain/stiffness. Plan for labs, ECG, CXR, flu, covid, and strep. Tylenol for sx's. Suspect viral process. [facility] COMPLAINT       Chief Complaint   Patient presents with    Shortness of Breath     c    Chills         HISTORY OF PRESENT ILLNESS        2 days of fever, chills, cough, short of breath, and sore throat. No vomiting/diarrhea. Diffuse body aches and HA. No rash or skin changes. No hx of similar breathing problems. No sick contacts. Review of External Medical Records:     Nursing Notes were reviewed. REVIEW OF SYSTEMS       Review of Systems   Constitutional: (-) weight loss. HEENT: (-) stiff neck   Eyes: (-) discharge. Respiratory: (+) cough. Cardiovascular: (-) syncope. Gastrointestinal: (-) blood in stool. Genitourinary: (-) hematuria. Musculoskeletal: (-) myalgias. Neurological: (-) seizure. Skin: (-) petechiae  Lymph/Immunologic: (-) enlarged lymph nodes  All other systems reviewed and are negative. PAST MEDICAL HISTORY   History reviewed. No pertinent past medical history. SURGICAL HISTORY     History reviewed. No pertinent surgical history. ALLERGIES     Patient has no known allergies. FAMILY HISTORY     History reviewed. No pertinent family history.        SOCIAL HISTORY       Social History     Socioeconomic History    Marital status:      Spouse name: None    Number of children: None    Years of education: None    Highest education level: None   Tobacco Use    Smoking status: Former     Types: Cigarettes     Quit date: 4/5/2016     Years since quitting:

## 2023-06-23 NOTE — ED NOTES
Patient left ED in no acute distress, alert and oriented x4. Patient was encouraged to come back if symptoms get worse. Patient was provided with discharge instructions and prescriptions. All questions were answered. Patient left ambulatory.          Elnora Bumpers, LPN  88/75/71 [id number]

## 2023-06-24 LAB
EKG ATRIAL RATE: 104 BPM
EKG DIAGNOSIS: NORMAL
EKG P AXIS: 42 DEGREES
EKG P-R INTERVAL: 136 MS
EKG Q-T INTERVAL: 316 MS
EKG QRS DURATION: 84 MS
EKG QTC CALCULATION (BAZETT): 415 MS
EKG R AXIS: 39 DEGREES
EKG T AXIS: 6 DEGREES
EKG VENTRICULAR RATE: 104 BPM

## 2023-06-24 PROCEDURE — 93010 ELECTROCARDIOGRAM REPORT: CPT | Performed by: INTERNAL MEDICINE
